# Patient Record
Sex: FEMALE | Race: WHITE | NOT HISPANIC OR LATINO | URBAN - METROPOLITAN AREA
[De-identification: names, ages, dates, MRNs, and addresses within clinical notes are randomized per-mention and may not be internally consistent; named-entity substitution may affect disease eponyms.]

---

## 2019-11-07 ENCOUNTER — OFFICE VISIT (OUTPATIENT)
Dept: OBSTETRICS AND GYNECOLOGY | Facility: CLINIC | Age: 24
End: 2019-11-07
Payer: MEDICAID

## 2019-11-07 VITALS — DIASTOLIC BLOOD PRESSURE: 84 MMHG | WEIGHT: 183.81 LBS | HEART RATE: 91 BPM | SYSTOLIC BLOOD PRESSURE: 150 MMHG

## 2019-11-07 DIAGNOSIS — N76.0 VULVOVAGINITIS: Primary | ICD-10-CM

## 2019-11-07 PROCEDURE — 99213 PR OFFICE/OUTPT VISIT, EST, LEVL III, 20-29 MIN: ICD-10-PCS | Mod: S$GLB,,, | Performed by: OBSTETRICS & GYNECOLOGY

## 2019-11-07 PROCEDURE — 99213 OFFICE O/P EST LOW 20 MIN: CPT | Mod: S$GLB,,, | Performed by: OBSTETRICS & GYNECOLOGY

## 2019-11-07 RX ORDER — LISDEXAMFETAMINE DIMESYLATE 50 MG/1
50 CAPSULE ORAL EVERY MORNING
Refills: 0 | COMMUNITY
Start: 2019-10-16

## 2019-11-07 NOTE — PROGRESS NOTES
Subjective:       Patient ID: Doris Cummins is a 23 y.o. female.    Chief Complaint:  Vaginal Pain      History of Present Illness  HPI  Patient has pain and burning and vaginal area.  Start this weekend.  She thinks she has poison ivy.  She said she Peed in the woods impression up against grasped.    GYN & OB History  No LMP recorded.   Date of Last Pap: No result found    OB History   No data available       Review of Systems  Review of Systems   Constitutional: Negative for chills and fever.   Respiratory: Negative for shortness of breath.    Cardiovascular: Negative for chest pain.   Gastrointestinal: Negative for abdominal pain, blood in stool, constipation, diarrhea, nausea, vomiting and reflux.   Genitourinary: Negative for dysmenorrhea, dyspareunia, dysuria, hematuria, hot flashes, menorrhagia, menstrual problem, pelvic pain, vaginal bleeding, vaginal discharge, postcoital bleeding and vaginal dryness.   Musculoskeletal: Negative for arthralgias and joint swelling.   Integumentary:  Negative for rash and hair changes.   Psychiatric/Behavioral: Negative for depression. The patient is not nervous/anxious.            Objective:    Physical Exam:   Constitutional: She appears well-developed and well-nourished. No distress.    HENT:   Head: Normocephalic and atraumatic.    Eyes: Conjunctivae and EOM are normal.    Neck: No tracheal deviation present. No thyromegaly present.    Cardiovascular: Exam reveals no clubbing, no cyanosis and no edema.     Pulmonary/Chest: Effort normal. No respiratory distress.        Abdominal: Soft. She exhibits no distension and no mass. There is no tenderness. There is no rebound and no guarding. No hernia.     Genitourinary: Rectum normal. Pelvic exam was performed with patient supine. There is tenderness and lesion on the right labia. There is no rash or injury on the right labia. There is no rash, tenderness, lesion or injury on the left labia. Right adnexum displays no mass,  no tenderness and no fullness. Left adnexum displays no mass, no tenderness and no fullness. There is tenderness in the vagina.                Skin: She is not diaphoretic. No cyanosis. Nails show no clubbing.           Assessment:        1. Vulvovaginitis               Plan:      hsv culture  rtc for [facility]

## 2019-11-08 LAB
HSV 1 DNA: NEGATIVE
HSV 2 DNA: POSITIVE
SPECIMEN SITE: ABNORMAL